# Patient Record
Sex: FEMALE
[De-identification: names, ages, dates, MRNs, and addresses within clinical notes are randomized per-mention and may not be internally consistent; named-entity substitution may affect disease eponyms.]

---

## 2020-12-10 PROBLEM — Z00.00 ENCOUNTER FOR PREVENTIVE HEALTH EXAMINATION: Status: ACTIVE | Noted: 2020-12-10

## 2020-12-11 ENCOUNTER — APPOINTMENT (OUTPATIENT)
Dept: OBGYN | Facility: CLINIC | Age: 50
End: 2020-12-11
Payer: COMMERCIAL

## 2020-12-11 ENCOUNTER — ASOB RESULT (OUTPATIENT)
Age: 50
End: 2020-12-11

## 2020-12-11 VITALS
SYSTOLIC BLOOD PRESSURE: 110 MMHG | HEIGHT: 60 IN | WEIGHT: 100 LBS | BODY MASS INDEX: 19.63 KG/M2 | DIASTOLIC BLOOD PRESSURE: 70 MMHG

## 2020-12-11 PROCEDURE — 76830 TRANSVAGINAL US NON-OB: CPT

## 2020-12-11 PROCEDURE — 99203 OFFICE O/P NEW LOW 30 MIN: CPT | Mod: 25

## 2020-12-11 PROCEDURE — 81003 URINALYSIS AUTO W/O SCOPE: CPT | Mod: QW

## 2020-12-11 PROCEDURE — 99072 ADDL SUPL MATRL&STAF TM PHE: CPT

## 2020-12-11 NOTE — PHYSICAL EXAM
[Appropriately responsive] : appropriately responsive [Alert] : alert [No Acute Distress] : no acute distress [Oriented x3] : oriented x3 [Labia Majora] : normal [Labia Minora] : normal [No Bleeding] : There was no active vaginal bleeding [Normal] : normal [Uterine Adnexae] : normal

## 2020-12-20 LAB
BILIRUB UR QL STRIP: NORMAL
GLUCOSE UR-MCNC: NORMAL
HCG UR QL: 0.2 EU/DL
HGB UR QL STRIP.AUTO: ABNORMAL
KETONES UR-MCNC: NORMAL
LEUKOCYTE ESTERASE UR QL STRIP: NORMAL
NITRITE UR QL STRIP: NORMAL
PH UR STRIP: 5
PROT UR STRIP-MCNC: NORMAL
SP GR UR STRIP: 1.01

## 2020-12-20 NOTE — END OF VISIT
[FreeTextEntry3] : I, Juliana Lugo, solely acted as scribe for Dr. Teodoro Gunter on 12/11/2020.\par All medical entries made by the Scribe were at my, Dr. Gunter's direction and personally dictated by me on 12/11/2020. I have reviewed the chart and agree that the record accurately reflects my personal performance of the history, physical exam, assessment and plan. I have also personally directed, reviewed, and agreed with the chart.

## 2020-12-20 NOTE — HISTORY OF PRESENT ILLNESS
[N] : Patient is not sexually active [Y] : Positive pregnancy history [Staining] : staining [Hot Flashes] : hot flashes [Yes] : Patient has concerns regarding sex [TextBox_4] : Consultation for menopausal  spotting [Mammogramdate] : 11/2020 [TextBox_19] : nati normal as per pt  [BreastSonogramDate] : 11/2020 [PapSmeardate] : 6/2020 [TextBox_31] : Dr Miller [ColonoscopyDate] : never had [PGHxTotal] : 2 [[facility]] : 2 [TextBox_29] : n/a [TextBox_36] : pt reports previous vaginal burning used monostat, reports painful intercourse vaginal dryness [TextBox_9] : 12 [TextBox_28] : spotting [TextBox_34] : reports dense breast [TextBox_18] : a [FreeTextEntry1] : painful, dryness

## 2020-12-20 NOTE — DISCUSSION/SUMMARY
[FreeTextEntry1] : Patient is anxious and nervous about her complaints of postmenopausal bleeding. We talked at length about the etiology of abnormal bleeding including the hormonal, structural and malignant causes. We reviewed today's pelvic sonogram findings which were normal. We discussed further and complete evaluation, namely office hysteroscopy with endometrial biopsy under sedation. The risks and benefits of the procedure was discussed in detail. Patient expressed understanding. Will send request to coordinator to schedule procedure. All of her concerns were addressed, questions answered and reassurance was given. \par \par During this visit 20 minutes were spent face-to-face with greater than 50% of the time dedicated to counseling.

## 2020-12-21 ENCOUNTER — NON-APPOINTMENT (OUTPATIENT)
Age: 50
End: 2020-12-21

## 2020-12-22 ENCOUNTER — NON-APPOINTMENT (OUTPATIENT)
Age: 50
End: 2020-12-22

## 2020-12-27 ENCOUNTER — NON-APPOINTMENT (OUTPATIENT)
Age: 50
End: 2020-12-27

## 2021-01-05 ENCOUNTER — APPOINTMENT (OUTPATIENT)
Dept: OBGYN | Facility: CLINIC | Age: 51
End: 2021-01-05
Payer: COMMERCIAL

## 2021-01-05 DIAGNOSIS — N95.0 POSTMENOPAUSAL BLEEDING: ICD-10-CM

## 2021-01-05 PROCEDURE — 99072 ADDL SUPL MATRL&STAF TM PHE: CPT

## 2021-01-05 PROCEDURE — 99213 OFFICE O/P EST LOW 20 MIN: CPT | Mod: 25

## 2021-01-05 PROCEDURE — 58100 BIOPSY OF UTERUS LINING: CPT

## 2021-01-05 NOTE — PHYSICAL EXAM
[Appropriately responsive] : appropriately responsive [Alert] : alert [No Acute Distress] : no acute distress [Oriented x3] : oriented x3 [Labia Majora] : normal [Labia Minora] : normal [Normal] : normal [No Bleeding] : There was no active vaginal bleeding [Cervical Stenosis] : cervical stenosis

## 2021-01-06 ENCOUNTER — TRANSCRIPTION ENCOUNTER (OUTPATIENT)
Age: 51
End: 2021-01-06

## 2021-01-09 ENCOUNTER — TRANSCRIPTION ENCOUNTER (OUTPATIENT)
Age: 51
End: 2021-01-09

## 2021-01-11 ENCOUNTER — TRANSCRIPTION ENCOUNTER (OUTPATIENT)
Age: 51
End: 2021-01-11

## 2021-01-11 LAB — CORE LAB BIOPSY: NORMAL

## 2021-01-12 ENCOUNTER — APPOINTMENT (OUTPATIENT)
Dept: DISASTER EMERGENCY | Facility: CLINIC | Age: 51
End: 2021-01-12

## 2021-01-13 ENCOUNTER — TRANSCRIPTION ENCOUNTER (OUTPATIENT)
Age: 51
End: 2021-01-13

## 2021-01-13 NOTE — END OF VISIT
[FreeTextEntry3] : I, Remberto Kearney, acted solely as a scribe for Dr. Gunter on this date 01/05/2021.\par All medical record entries made by the Scribe were at my, Dr. Gunter's direction and personally dictated by me on  01/05/2021. I have reviewed the chart and agree that the record accurately reflects my personal performance of the history, physical exam, assessment and plan. I have also personally directed, reviewed, and agreed with the chart.\par

## 2021-01-13 NOTE — PLAN
[FreeTextEntry1] : We discussed the results of pelvic sonogram from 12/11/20 which demonstrated heterogenous myometrium endo thickness measuring 4 mm with scant cavity fluid, normal appearing RT ovary and clear LT ovary cyst 7 mm. Differential diagnosis of postmenopausal bleeding was discussed. Etiologies include normal endometrium vs polypoid endometrium vs hyperplasia vs malignancy. Management options reviewed with patient offered office hysteroscopy vs same procedure under sedation. She states that she has a leaky heart valve and feels anxious about having the procedure under anesthesia. She prefers to have EMB in the office. We discussed the findings on pelvic exam: Cervical stenosis.I told her we can try to proceed with EMB by using lidocaine to numb the cervix. She was agreeable. Informed consent was obtained. EMB was performed without any issues. Post procedure expectations were given. Patient was encouraged to register for the portal, if not already registered. The use of the portal for non- emergent communication was discussed- patient is aware to avoid use for emergent issues. She will message me on the portal for a discussion of the results.\par \par During this visit 30 minutes were spent face-to-face with greater than 50% of the time dedicated to counseling.\par \par \par

## 2021-01-13 NOTE — HISTORY OF PRESENT ILLNESS
[Patient reported PAP Smear was normal] : Patient reported PAP Smear was normal [Y] : Positive pregnancy history [Menarche Age: ____] : age at menarche was [unfilled] [FreeTextEntry1] : Beatriz is here for follow up visit. She is doing well. She feels very anxious about this whole situation of postmenopausal spotting. She reports having vaginal irritation and associates it with having possible yeast infection.\par \par \par  [PapSmeardate] : 06/03/20 [PGHxTotal] : 2 [facility]

## 2021-01-15 ENCOUNTER — APPOINTMENT (OUTPATIENT)
Dept: OBGYN | Facility: HOSPITAL | Age: 51
End: 2021-01-15

## 2021-01-19 ENCOUNTER — APPOINTMENT (OUTPATIENT)
Dept: OBGYN | Facility: CLINIC | Age: 51
End: 2021-01-19
Payer: COMMERCIAL

## 2021-01-19 VITALS
HEIGHT: 60 IN | DIASTOLIC BLOOD PRESSURE: 68 MMHG | TEMPERATURE: 96.8 F | BODY MASS INDEX: 19.63 KG/M2 | WEIGHT: 100 LBS | SYSTOLIC BLOOD PRESSURE: 94 MMHG

## 2021-01-19 PROCEDURE — 99212 OFFICE O/P EST SF 10 MIN: CPT

## 2021-01-19 PROCEDURE — 99072 ADDL SUPL MATRL&STAF TM PHE: CPT

## 2021-01-22 NOTE — DISCUSSION/SUMMARY
[FreeTextEntry1] : We discussed the nature of cervical stenosis as related to menopause and the was given a complete discussion on the pathology report which was negative. She will RTO on her regular schedule. All questions were answered.

## 2021-01-22 NOTE — PHYSICAL EXAM
[Appropriately responsive] : appropriately responsive [Alert] : alert [No Acute Distress] : no acute distress [Soft] : soft

## 2021-01-22 NOTE — HISTORY OF PRESENT ILLNESS
[Patient reported mammogram was normal] : Patient reported mammogram was normal [Patient reported breast sonogram was normal] : Patient reported breast sonogram was normal [Patient reported PAP Smear was normal] : Patient reported PAP Smear was normal [LMP unknown] : LMP unknown [N] : Patient is not sexually active [Y] : Positive pregnancy history [Night Sweats] : night sweats [unknown] : Patient is unsure of the date of her LMP [Menarche Age: ____] : age at menarche was [unfilled] [Previously active] : previously active [Men] : men [Vaginal] : vaginal [No] : No [FreeTextEntry1] : Beatriz is here for follow up after her in-office treatment for cervical stenosis and an endometrial biopsy. She is relieved and happy that it was able to be accomplished.  [Mammogramdate] : 11/01/2020 [TextBox_19] : AS PER PATIENT [BreastSonogramDate] : 11/01/2020 [TextBox_25] : AS PER PATIENT [TextBox_31] : AS PER PATIENT  [PapSmeardate] : 06/01/2020 [PGHxTotal] : 2 [facility] [[facility] : 2

## 2021-01-29 ENCOUNTER — APPOINTMENT (OUTPATIENT)
Dept: OBGYN | Facility: CLINIC | Age: 51
End: 2021-01-29

## 2021-06-23 ENCOUNTER — APPOINTMENT (OUTPATIENT)
Dept: OBGYN | Facility: CLINIC | Age: 51
End: 2021-06-23
Payer: COMMERCIAL

## 2021-06-23 VITALS
WEIGHT: 100.13 LBS | TEMPERATURE: 97.8 F | SYSTOLIC BLOOD PRESSURE: 110 MMHG | DIASTOLIC BLOOD PRESSURE: 70 MMHG | HEIGHT: 60 IN | BODY MASS INDEX: 19.66 KG/M2

## 2021-06-23 DIAGNOSIS — Z78.9 OTHER SPECIFIED HEALTH STATUS: ICD-10-CM

## 2021-06-23 DIAGNOSIS — Z82.49 FAMILY HISTORY OF ISCHEMIC HEART DISEASE AND OTHER DISEASES OF THE CIRCULATORY SYSTEM: ICD-10-CM

## 2021-06-23 DIAGNOSIS — Z80.3 FAMILY HISTORY OF MALIGNANT NEOPLASM OF BREAST: ICD-10-CM

## 2021-06-23 DIAGNOSIS — Z01.419 ENCOUNTER FOR GYNECOLOGICAL EXAMINATION (GENERAL) (ROUTINE) W/OUT ABNORMAL FINDINGS: ICD-10-CM

## 2021-06-23 DIAGNOSIS — Z83.3 FAMILY HISTORY OF DIABETES MELLITUS: ICD-10-CM

## 2021-06-23 PROCEDURE — 99396 PREV VISIT EST AGE 40-64: CPT

## 2021-06-23 PROCEDURE — 99072 ADDL SUPL MATRL&STAF TM PHE: CPT

## 2021-06-23 NOTE — PHYSICAL EXAM
[Appropriately responsive] : appropriately responsive [Alert] : alert [No Acute Distress] : no acute distress [Soft] : soft [Non-tender] : non-tender [Non-distended] : non-distended [Oriented x3] : oriented x3 [Examination Of The Breasts] : a normal appearance [No Discharge] : no discharge [No Masses] : no breast masses were palpable [Labia Majora] : normal [Labia Minora] : normal [Atrophy] : atrophy [No Bleeding] : There was no active vaginal bleeding [Normal] : normal

## 2021-06-23 NOTE — HISTORY OF PRESENT ILLNESS
[LMP unknown] : LMP unknown [N] : Patient does not use contraception [Y] : Positive pregnancy history [unknown] : Patient is unsure of the date of her LMP [Menarche Age: ____] : age at menarche was [unfilled] [Currently Active] : currently active [Men] : men [Vaginal] : vaginal [No] : No [Patient refuses STI testing] : Patient refuses STI testing [FreeTextEntry1] : \par \par Beatriz is 50 y/o presents for an annual well woman gyn visit.\par \par Reports she is doing well. Her last mammo and BUS were on 11/09/2020 [facility].\par \par LMP is unknown.\par \par Denies GYN complaints.\par  [Mammogramdate] : 01/19/2021 [TextBox_19] : BR1 [BreastSonogramDate] : 01/19/2021 [TextBox_25] : BR1 [PapSmeardate] : 06/03/2020 [TextBox_31] : AS PER PT  [PGHxTotal] : 2 [[facility] [[facility]] : 2

## 2021-06-23 NOTE — DISCUSSION/SUMMARY
[FreeTextEntry1] : \par 50 y/o\par gyn annual\par -pap w/ HPV\par -Mammo and breast US script\par \par RTO in one year for gyn annual and prn.\par

## 2021-07-06 LAB
CYTOLOGY CVX/VAG DOC THIN PREP: NORMAL
HPV HIGH+LOW RISK DNA PNL CVX: NOT DETECTED

## 2022-06-27 ENCOUNTER — APPOINTMENT (OUTPATIENT)
Dept: OBGYN | Facility: CLINIC | Age: 52
End: 2022-06-27
Payer: COMMERCIAL

## 2022-06-27 ENCOUNTER — APPOINTMENT (OUTPATIENT)
Dept: OBGYN | Facility: CLINIC | Age: 52
End: 2022-06-27

## 2022-06-27 VITALS
SYSTOLIC BLOOD PRESSURE: 112 MMHG | HEIGHT: 60 IN | DIASTOLIC BLOOD PRESSURE: 66 MMHG | BODY MASS INDEX: 19.63 KG/M2 | WEIGHT: 100 LBS

## 2022-06-27 PROCEDURE — 99396 PREV VISIT EST AGE 40-64: CPT

## 2022-06-27 NOTE — HISTORY OF PRESENT ILLNESS
[LMP unknown] : LMP unknown [N] : Patient does not use contraception [Y] : Positive pregnancy history [unknown] : Patient is unsure of the date of her LMP [Menarche Age: ____] : age at menarche was [unfilled] [Currently Active] : currently active [Men] : men [Vaginal] : vaginal [No] : No [Patient refuses STI testing] : Patient refuses STI testing [FreeTextEntry1] : Beatriz is a  LMP 3 years ago postmenopausal several years who presents for annual exam. She is without complaints. Denies pelvic pain, abnormal bleeding, or vaginal discharge. Denies issues with bowel or bladder function. \par She is sexually active with 1 male partner (s). She has dryness during intercourse.  \par  Feels safe at home. Denies depression/abuse. \par \par  [Mammogramdate] : 11/12/2021 [TextBox_19] : BR2 [BreastSonogramDate] : 11/12/2021 [TextBox_25] : BR1 [PapSmeardate] : 06/12/2021 [TextBox_31] : NORMAL [HPVDate] : 06/23/2021 [TextBox_78] : NEG [PGHxTotal] : 2 [[facility]] : 2 [[facility]] : 2

## 2022-06-27 NOTE — PHYSICAL EXAM
[Chaperone Present] : A chaperone was present in the examining room during all aspects of the physical examination [Appropriately responsive] : appropriately responsive [Alert] : alert [No Acute Distress] : no acute distress [No Lymphadenopathy] : no lymphadenopathy [Soft] : soft [Non-tender] : non-tender [Non-distended] : non-distended [Oriented x3] : oriented x3 [Examination Of The Breasts] : a normal appearance [No Discharge] : no discharge [No Masses] : no breast masses were palpable [Labia Majora] : normal [Labia Minora] : normal [No Bleeding] : There was no active vaginal bleeding [Normal] : normal [Uterine Adnexae] : non-palpable [FreeTextEntry1] : gagan

## 2022-06-27 NOTE — DISCUSSION/SUMMARY
[FreeTextEntry1] :   The benefits of adequate calcium intake and a daily multivitamin along with routine daily cardiovascular exercise were reviewed with the patient.\par   The patient was informed regarding the benefits of a screening colonoscopy.\par   The importance of safer-sex was discussed with the patient.\par We reviewed ASCCP/ACOG guidelines for pap smears. \par Use of vaginal lubricators was reviewed.

## 2022-07-01 LAB — HPV HIGH+LOW RISK DNA PNL CVX: NOT DETECTED

## 2022-07-02 LAB — CYTOLOGY CVX/VAG DOC THIN PREP: ABNORMAL

## 2022-08-05 ENCOUNTER — TRANSCRIPTION ENCOUNTER (OUTPATIENT)
Age: 52
End: 2022-08-05

## 2022-08-05 ENCOUNTER — NON-APPOINTMENT (OUTPATIENT)
Age: 52
End: 2022-08-05

## 2022-08-08 ENCOUNTER — TRANSCRIPTION ENCOUNTER (OUTPATIENT)
Age: 52
End: 2022-08-08

## 2022-08-15 ENCOUNTER — NON-APPOINTMENT (OUTPATIENT)
Age: 52
End: 2022-08-15

## 2022-09-15 ENCOUNTER — APPOINTMENT (OUTPATIENT)
Dept: UROLOGY | Facility: CLINIC | Age: 52
End: 2022-09-15

## 2022-09-15 VITALS
WEIGHT: 100 LBS | BODY MASS INDEX: 19.63 KG/M2 | HEART RATE: 68 BPM | OXYGEN SATURATION: 97 % | HEIGHT: 60 IN | DIASTOLIC BLOOD PRESSURE: 73 MMHG | SYSTOLIC BLOOD PRESSURE: 118 MMHG

## 2022-09-15 PROCEDURE — 99203 OFFICE O/P NEW LOW 30 MIN: CPT

## 2022-09-15 NOTE — ASSESSMENT
[FreeTextEntry1] : 52 year old woman with BLOOD on UA, but not clinically significant number of RBCs on microscopy. We discussed that microscopic hematuria work up is recommended for >3 RBCs/hpf (>5 RBCs for [facility] lab). We discussed that for microscopic hematuria, the differential diagnosis includes both benign and malignant conditions including renal stones, BPH, urinary tract infections, and cancer of the bladder or ureter or kidney. Cystoscopy would be recommended to rule out pathology in the bladder. CT Urogram would be recommended to evaluate for presence of nephroureteral stones or malignancies. Urinalysis and urine culture were recommended to recheck for urinary RBCS and urinary tract infection. Pt agrees and understands.\par \par Abnormal Urinalysis\par - UA, UCx\par - AMH work up if UA reveals significant RBCs\par - If not, RTO in 3 months for repeat urine studies

## 2022-09-15 NOTE — HISTORY OF PRESENT ILLNESS
[FreeTextEntry1] : 52 year old woman seen 09/15/2022 with complaint of microscopic hematuria. She reports he had gross hematuria in setting of UTI, but it resolved with treatment of UTI. She had UA wihtout micro that showed blood, no microscopy done. CTU was negative.

## 2022-09-16 LAB
APPEARANCE: CLEAR
BACTERIA: NEGATIVE
BILIRUBIN URINE: NEGATIVE
BLOOD URINE: NEGATIVE
COLOR: YELLOW
GLUCOSE QUALITATIVE U: NEGATIVE
HYALINE CASTS: 1 /LPF
KETONES URINE: NEGATIVE
LEUKOCYTE ESTERASE URINE: NEGATIVE
MICROSCOPIC-UA: NORMAL
NITRITE URINE: NEGATIVE
PH URINE: 6
PROTEIN URINE: NEGATIVE
RED BLOOD CELLS URINE: 3 /HPF
SPECIFIC GRAVITY URINE: >=1.03
SQUAMOUS EPITHELIAL CELLS: 1 /HPF
UROBILINOGEN URINE: NORMAL
WHITE BLOOD CELLS URINE: 2 /HPF

## 2022-09-19 ENCOUNTER — APPOINTMENT (OUTPATIENT)
Dept: GASTROENTEROLOGY | Facility: CLINIC | Age: 52
End: 2022-09-19

## 2022-09-19 VITALS
BODY MASS INDEX: 19.63 KG/M2 | DIASTOLIC BLOOD PRESSURE: 81 MMHG | WEIGHT: 100 LBS | SYSTOLIC BLOOD PRESSURE: 114 MMHG | HEIGHT: 60 IN | HEART RATE: 66 BPM

## 2022-09-19 DIAGNOSIS — K42.9 UMBILICAL HERNIA W/OUT OBSTRUCTION OR GANGRENE: ICD-10-CM

## 2022-09-19 DIAGNOSIS — K76.89 OTHER SPECIFIED DISEASES OF LIVER: ICD-10-CM

## 2022-09-19 LAB — BACTERIA UR CULT: NORMAL

## 2022-09-19 PROCEDURE — 99202 OFFICE O/P NEW SF 15 MIN: CPT

## 2022-09-20 ENCOUNTER — TRANSCRIPTION ENCOUNTER (OUTPATIENT)
Age: 52
End: 2022-09-20

## 2022-09-27 PROBLEM — K76.89 HEPATIC CYST: Status: ACTIVE | Noted: 2022-09-27

## 2022-09-27 PROBLEM — K42.9 UMBILICAL HERNIA WITHOUT OBSTRUCTION AND WITHOUT GANGRENE: Status: ACTIVE | Noted: 2022-09-27

## 2022-09-27 NOTE — HISTORY OF PRESENT ILLNESS
[FreeTextEntry1] : 51yo female with liver lesion on ct scan\par \par Pt referred for evaluation of liver lesion\par \par I reviewed imaging and shows small liver cyst\par \par Also with small umbilical hernia\par She up to date on colon cancer screening

## 2022-09-27 NOTE — PHYSICAL EXAM
[Alert] : alert [Normal Voice/Communication] : normal voice/communication [Healthy Appearing] : healthy appearing [No Acute Distress] : no acute distress [Sclera] : the sclera and conjunctiva were normal [Hearing Threshold Finger Rub Not [place]] : hearing was normal [Normal Lips/Gums] : the lips and gums were normal [Oropharynx] : the oropharynx was normal [Normal Appearance] : the appearance of the neck was normal [No Neck Mass] : no neck mass was observed [No Respiratory Distress] : no respiratory distress [No Acc Muscle Use] : no accessory muscle use [Respiration, Rhythm And Depth] : normal respiratory rhythm and effort [Auscultation Breath Sounds / Voice Sounds] : lungs were clear to auscultation bilaterally [Heart Rate And Rhythm] : heart rate was normal and rhythm regular [Normal S1, S2] : normal S1 and S2 [Murmurs] : no murmurs [Bowel Sounds] : normal bowel sounds [Abdomen Tenderness] : non-tender [No Masses] : no abdominal mass palpated [Abdomen Soft] : soft [] : no hepatosplenomegaly [Umbilical] : umbilical [Oriented To Time, Place, And Person] : oriented to person, place, and time

## 2022-12-15 ENCOUNTER — APPOINTMENT (OUTPATIENT)
Dept: UROLOGY | Facility: CLINIC | Age: 52
End: 2022-12-15

## 2022-12-15 VITALS
SYSTOLIC BLOOD PRESSURE: 131 MMHG | WEIGHT: 100 LBS | BODY MASS INDEX: 19.63 KG/M2 | OXYGEN SATURATION: 97 % | HEIGHT: 60 IN | DIASTOLIC BLOOD PRESSURE: 81 MMHG | HEART RATE: 61 BPM

## 2022-12-15 DIAGNOSIS — R31.29 OTHER MICROSCOPIC HEMATURIA: ICD-10-CM

## 2022-12-15 PROCEDURE — 99213 OFFICE O/P EST LOW 20 MIN: CPT

## 2022-12-15 NOTE — ASSESSMENT
[FreeTextEntry1] : 52 year old woman with BLOOD on UA, but not clinically significant number of RBCs on microscopy. We discussed that microscopic hematuria work up is recommended for >3 RBCs/hpf (>5 RBCs for [facility] lab). We discussed that for microscopic hematuria, the differential diagnosis includes both benign and malignant conditions including renal stones, BPH, urinary tract infections, and cancer of the bladder or ureter or kidney. Cystoscopy would be recommended to rule out pathology in the bladder. CT Urogram would be recommended to evaluate for presence of nephroureteral stones or malignancies. Urinalysis and urine culture were recommended to recheck for urinary RBCS and urinary tract infection. Pt agrees and understands.\par \par Abnormal Urinalysis\par - UA, UCx\par - AMH work up if UA reveals significant RBCs\par - If not, RTO PRN

## 2022-12-15 NOTE — HISTORY OF PRESENT ILLNESS
[FreeTextEntry1] : 52 year old woman seen 09/15/2022 with complaint of microscopic hematuria. She reports he had gross hematuria in setting of UTI, but it resolved with treatment of UTI. She had UA wihtout micro that showed blood, no microscopy done. CT renal stone protocol was negative. \par \par 12/15/2022: Patient presents for follow up. Urine studies negative at last visit. She reports  symptoms and other medical  issues remain unchanged from above. No hematuria, no dysuria, no frequency, no urgency, no hesitancy, no straining. No incontinence. No fevers, no chills, no nausea, no vomiting, no flank pain.

## 2022-12-16 ENCOUNTER — TRANSCRIPTION ENCOUNTER (OUTPATIENT)
Age: 52
End: 2022-12-16

## 2022-12-16 LAB
APPEARANCE: CLEAR
BACTERIA: NEGATIVE
BILIRUBIN URINE: NEGATIVE
BLOOD URINE: NEGATIVE
COLOR: NORMAL
GLUCOSE QUALITATIVE U: NEGATIVE
HYALINE CASTS: 0 /LPF
KETONES URINE: NEGATIVE
LEUKOCYTE ESTERASE URINE: NEGATIVE
MICROSCOPIC-UA: NORMAL
NITRITE URINE: NEGATIVE
PH URINE: 6
PROTEIN URINE: NEGATIVE
RED BLOOD CELLS URINE: 0 /HPF
SPECIFIC GRAVITY URINE: 1.01
SQUAMOUS EPITHELIAL CELLS: 0 /HPF
UROBILINOGEN URINE: NORMAL
WHITE BLOOD CELLS URINE: 0 /HPF

## 2022-12-19 ENCOUNTER — TRANSCRIPTION ENCOUNTER (OUTPATIENT)
Age: 52
End: 2022-12-19

## 2022-12-19 LAB — BACTERIA UR CULT: NORMAL

## 2023-03-22 ENCOUNTER — TRANSCRIPTION ENCOUNTER (OUTPATIENT)
Age: 53
End: 2023-03-22

## 2023-04-06 ENCOUNTER — TRANSCRIPTION ENCOUNTER (OUTPATIENT)
Age: 53
End: 2023-04-06

## 2023-06-01 ENCOUNTER — APPOINTMENT (OUTPATIENT)
Dept: GASTROENTEROLOGY | Facility: CLINIC | Age: 53
End: 2023-06-01
Payer: COMMERCIAL

## 2023-06-01 VITALS
SYSTOLIC BLOOD PRESSURE: 117 MMHG | HEART RATE: 61 BPM | DIASTOLIC BLOOD PRESSURE: 71 MMHG | WEIGHT: 100 LBS | HEIGHT: 60 IN | BODY MASS INDEX: 19.63 KG/M2

## 2023-06-01 DIAGNOSIS — R19.4 CHANGE IN BOWEL HABIT: ICD-10-CM

## 2023-06-01 DIAGNOSIS — Z12.11 ENCOUNTER FOR SCREENING FOR MALIGNANT NEOPLASM OF COLON: ICD-10-CM

## 2023-06-01 PROCEDURE — 99213 OFFICE O/P EST LOW 20 MIN: CPT

## 2023-06-01 NOTE — ASSESSMENT
[FreeTextEntry1] : 54yo female for screening colonoscopy\par \par will use 2 day miralax gatorade prep given relative constipation\par Risks and benefits of procedure(s) discussed with patient in detail, including but not limited to, perforation, bleeding, reaction to anesthesia, missed lesions.\par

## 2023-06-01 NOTE — PHYSICAL EXAM

## 2023-06-05 ENCOUNTER — TRANSCRIPTION ENCOUNTER (OUTPATIENT)
Age: 53
End: 2023-06-05

## 2023-06-12 ENCOUNTER — TRANSCRIPTION ENCOUNTER (OUTPATIENT)
Age: 53
End: 2023-06-12

## 2023-06-16 ENCOUNTER — RESULT REVIEW (OUTPATIENT)
Age: 53
End: 2023-06-16

## 2023-06-16 ENCOUNTER — APPOINTMENT (OUTPATIENT)
Dept: GASTROENTEROLOGY | Facility: AMBULATORY MEDICAL SERVICES | Age: 53
End: 2023-06-16
Payer: COMMERCIAL

## 2023-06-16 PROCEDURE — 45385 COLONOSCOPY W/LESION REMOVAL: CPT | Mod: 33

## 2023-06-16 PROCEDURE — 45380 COLONOSCOPY AND BIOPSY: CPT | Mod: 33,59

## 2023-06-26 ENCOUNTER — TRANSCRIPTION ENCOUNTER (OUTPATIENT)
Age: 53
End: 2023-06-26

## 2023-06-30 ENCOUNTER — APPOINTMENT (OUTPATIENT)
Dept: COLORECTAL SURGERY | Facility: CLINIC | Age: 53
End: 2023-06-30
Payer: COMMERCIAL

## 2023-06-30 DIAGNOSIS — R89.8 OTHER ABNORMAL FINDINGS IN SPECIMENS FROM OTHER ORGANS, SYSTEMS AND TISSUES: ICD-10-CM

## 2023-06-30 PROCEDURE — 99443: CPT

## 2023-07-05 ENCOUNTER — TRANSCRIPTION ENCOUNTER (OUTPATIENT)
Age: 53
End: 2023-07-05

## 2023-07-18 ENCOUNTER — APPOINTMENT (OUTPATIENT)
Dept: OBGYN | Facility: CLINIC | Age: 53
End: 2023-07-18
Payer: COMMERCIAL

## 2023-07-18 VITALS
HEIGHT: 60 IN | WEIGHT: 100 LBS | BODY MASS INDEX: 19.63 KG/M2 | SYSTOLIC BLOOD PRESSURE: 126 MMHG | DIASTOLIC BLOOD PRESSURE: 86 MMHG

## 2023-07-18 DIAGNOSIS — Z12.31 ENCOUNTER FOR SCREENING MAMMOGRAM FOR MALIGNANT NEOPLASM OF BREAST: ICD-10-CM

## 2023-07-18 DIAGNOSIS — N95.2 POSTMENOPAUSAL ATROPHIC VAGINITIS: ICD-10-CM

## 2023-07-18 DIAGNOSIS — R92.2 INCONCLUSIVE MAMMOGRAM: ICD-10-CM

## 2023-07-18 DIAGNOSIS — Z01.419 ENCOUNTER FOR GYNECOLOGICAL EXAMINATION (GENERAL) (ROUTINE) W/OUT ABNORMAL FINDINGS: ICD-10-CM

## 2023-07-18 DIAGNOSIS — Z12.4 ENCOUNTER FOR SCREENING FOR MALIGNANT NEOPLASM OF CERVIX: ICD-10-CM

## 2023-07-18 PROCEDURE — 99396 PREV VISIT EST AGE 40-64: CPT

## 2023-07-18 NOTE — HISTORY OF PRESENT ILLNESS
[No] : Patient does not have concerns regarding sex [Previously active] : previously active [Patient refuses STI testing] : Patient refuses STI testing [FreeTextEntry1] : Beatriz is a  LMP  who presents for annual exam. She is without complaints. Denies pelvic pain, abnormal bleeding, or vaginal discharge. Denies issues with bowel or bladder function. \par 2 c-sections\par She is sexually active with male partner (s). \par Feels safe at home. Denies depression/abuse. \par \par Recent colonoscopy showing multiple polyps, 2 with high grade dysplasia. She is scheduled for another colonoscopy in July due to multiple polyps, they were not able to get everything on the first try. \par Her father recently passed away from throat cancer. Aunt with ovarian cancer. Cousin with breast cancer.

## 2023-07-18 NOTE — PHYSICAL EXAM
[Chaperone Present] : A chaperone was present in the examining room during all aspects of the physical examination [Appropriately responsive] : appropriately responsive [Alert] : alert [No Acute Distress] : no acute distress [No Lymphadenopathy] : no lymphadenopathy [Soft] : soft [Non-tender] : non-tender [Non-distended] : non-distended [Oriented x3] : oriented x3 [Examination Of The Breasts] : a normal appearance [No Discharge] : no discharge [No Masses] : no breast masses were palpable [Labia Majora] : normal [Labia Minora] : normal [Atrophy] : atrophy [No Bleeding] : There was no active vaginal bleeding [Normal] : normal [Uterine Adnexae] : non-palpable [FreeTextEntry1] : gagan

## 2023-07-20 ENCOUNTER — TRANSCRIPTION ENCOUNTER (OUTPATIENT)
Age: 53
End: 2023-07-20

## 2023-07-20 LAB — HPV HIGH+LOW RISK DNA PNL CVX: NOT DETECTED

## 2023-07-21 LAB — CYTOLOGY CVX/VAG DOC THIN PREP: ABNORMAL

## 2023-07-24 ENCOUNTER — TRANSCRIPTION ENCOUNTER (OUTPATIENT)
Age: 53
End: 2023-07-24

## 2023-07-25 ENCOUNTER — NON-APPOINTMENT (OUTPATIENT)
Age: 53
End: 2023-07-25

## 2023-07-25 PROBLEM — R89.8 ABNORMAL GENETIC TEST: Status: ACTIVE | Noted: 2023-07-25

## 2023-07-28 ENCOUNTER — APPOINTMENT (OUTPATIENT)
Dept: GASTROENTEROLOGY | Facility: AMBULATORY MEDICAL SERVICES | Age: 53
End: 2023-07-28
Payer: COMMERCIAL

## 2023-07-28 ENCOUNTER — RESULT REVIEW (OUTPATIENT)
Age: 53
End: 2023-07-28

## 2023-07-28 PROCEDURE — 45385 COLONOSCOPY W/LESION REMOVAL: CPT | Mod: GC

## 2023-07-28 PROCEDURE — 45380 COLONOSCOPY AND BIOPSY: CPT | Mod: GC,59

## 2023-08-03 ENCOUNTER — TRANSCRIPTION ENCOUNTER (OUTPATIENT)
Age: 53
End: 2023-08-03

## 2023-08-15 ENCOUNTER — TRANSCRIPTION ENCOUNTER (OUTPATIENT)
Age: 53
End: 2023-08-15

## 2023-08-16 ENCOUNTER — TRANSCRIPTION ENCOUNTER (OUTPATIENT)
Age: 53
End: 2023-08-16

## 2023-08-29 ENCOUNTER — TRANSCRIPTION ENCOUNTER (OUTPATIENT)
Age: 53
End: 2023-08-29

## 2023-09-01 ENCOUNTER — TRANSCRIPTION ENCOUNTER (OUTPATIENT)
Age: 53
End: 2023-09-01

## 2023-09-05 ENCOUNTER — TRANSCRIPTION ENCOUNTER (OUTPATIENT)
Age: 53
End: 2023-09-05

## 2023-09-15 ENCOUNTER — TRANSCRIPTION ENCOUNTER (OUTPATIENT)
Age: 53
End: 2023-09-15

## 2023-10-20 ENCOUNTER — NON-APPOINTMENT (OUTPATIENT)
Age: 53
End: 2023-10-20

## 2024-01-11 NOTE — ASSESSMENT
[FreeTextEntry1] : 53yo female with benign hepatic cyst, small umbilical hernia\par \par small hepatic cyst\par no further imaging needed\par \par asymptomatic umbilical hernia - will observe\par pt to call if develops discomfort
Provider pre-printed instructions given

## 2024-02-01 ENCOUNTER — APPOINTMENT (OUTPATIENT)
Dept: GASTROENTEROLOGY | Facility: AMBULATORY MEDICAL SERVICES | Age: 54
End: 2024-02-01
Payer: COMMERCIAL

## 2024-02-01 ENCOUNTER — RESULT REVIEW (OUTPATIENT)
Age: 54
End: 2024-02-01

## 2024-02-01 PROCEDURE — 45380 COLONOSCOPY AND BIOPSY: CPT | Mod: 33,59

## 2024-02-01 PROCEDURE — 45385 COLONOSCOPY W/LESION REMOVAL: CPT | Mod: 33

## 2024-02-02 ENCOUNTER — TRANSCRIPTION ENCOUNTER (OUTPATIENT)
Age: 54
End: 2024-02-02

## 2024-02-05 ENCOUNTER — TRANSCRIPTION ENCOUNTER (OUTPATIENT)
Age: 54
End: 2024-02-05

## 2024-02-06 ENCOUNTER — TRANSCRIPTION ENCOUNTER (OUTPATIENT)
Age: 54
End: 2024-02-06

## 2024-02-07 ENCOUNTER — TRANSCRIPTION ENCOUNTER (OUTPATIENT)
Age: 54
End: 2024-02-07

## 2024-02-27 ENCOUNTER — APPOINTMENT (OUTPATIENT)
Dept: COLORECTAL SURGERY | Facility: CLINIC | Age: 54
End: 2024-02-27
Payer: COMMERCIAL

## 2024-02-27 DIAGNOSIS — K63.5 POLYP OF COLON: ICD-10-CM

## 2024-02-27 PROCEDURE — 99205 OFFICE O/P NEW HI 60 MIN: CPT

## 2024-02-28 ENCOUNTER — TRANSCRIPTION ENCOUNTER (OUTPATIENT)
Age: 54
End: 2024-02-28

## 2024-02-29 PROBLEM — K63.5 COLON POLYPS: Status: ACTIVE | Noted: 2023-06-30

## 2024-02-29 NOTE — ASSESSMENT
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  Discussed with patient and  recommendation would be for a total colectomy with ileorectal anastomosis.  This operation would reduce the chance of developing colon cancer in her colon and also give her the best option in terms of bowel function control.  Risk and benefits of the surgery have been discussed which include bleeding, infection, sepsis, multiorgan failure, inadvertent injury including hollow viscus, solid organ, ureter neurovascular and neurological structures, DVT PE, heart attack, stroke, hernias, recurrence, leakage of anastomosis require reoperation possible stoma and death. Presurgical testing with CBC BMP PT PTT chest x-ray EKG hemoglobin A1c mechanical bowel preparation prophylactic antibiotics DVT prophylaxis

## 2024-02-29 NOTE — HISTORY OF PRESENT ILLNESS
[FreeTextEntry1] : 53-year-old female with MYH- variant of familial polyposis syndrome. she has been found to have a significant amount of polyps throughout her colon currently her gastroenterologist Dr. Rod is very concerned given the extensive amount of polyps

## 2024-02-29 NOTE — PHYSICAL EXAM
[Abdomen Masses] : No abdominal masses [Abdomen Tenderness] : ~T No ~M abdominal tenderness [JVD] : no jugular venous distention  [Normal Breath Sounds] : Normal breath sounds [Normal Heart Sounds] : normal heart sounds [No Rash or Lesion] : No rash or lesion [Normal Rate and Rhythm] : normal rate and rhythm [Alert] : alert [Oriented to Person] : oriented to person [Oriented to Time] : oriented to time [Oriented to Place] : oriented to place [Anxious] : anxious [de-identified] : Looks well in no distress, of stated age. [de-identified] :  scar [de-identified] : Moves all 4 extremities appropriately with 5 over 5 strength [de-identified] : Pupils equal reactive to light normocephalic atraumatic.

## 2024-02-29 NOTE — DATA REVIEWED
[FreeTextEntry1] : Colonoscopies demonstrate multiple polyps throughout the colon mostly rectal sparing

## 2024-03-01 ENCOUNTER — TRANSCRIPTION ENCOUNTER (OUTPATIENT)
Age: 54
End: 2024-03-01

## 2024-03-04 ENCOUNTER — TRANSCRIPTION ENCOUNTER (OUTPATIENT)
Age: 54
End: 2024-03-04

## 2024-03-05 ENCOUNTER — APPOINTMENT (OUTPATIENT)
Dept: COLORECTAL SURGERY | Facility: CLINIC | Age: 54
End: 2024-03-05
Payer: COMMERCIAL

## 2024-03-05 ENCOUNTER — TRANSCRIPTION ENCOUNTER (OUTPATIENT)
Age: 54
End: 2024-03-05

## 2024-03-05 VITALS — BODY MASS INDEX: 19.63 KG/M2 | HEIGHT: 60 IN | WEIGHT: 100 LBS

## 2024-03-05 DIAGNOSIS — D12.6 BENIGN NEOPLASM OF COLON, UNSPECIFIED: ICD-10-CM

## 2024-03-05 DIAGNOSIS — D13.91 FAMILIAL ADENOMATOUS POLYPOSIS: ICD-10-CM

## 2024-03-05 PROCEDURE — 99443: CPT

## 2024-03-06 ENCOUNTER — TRANSCRIPTION ENCOUNTER (OUTPATIENT)
Age: 54
End: 2024-03-06

## 2024-03-08 ENCOUNTER — TRANSCRIPTION ENCOUNTER (OUTPATIENT)
Age: 54
End: 2024-03-08

## 2024-03-11 ENCOUNTER — OUTPATIENT (OUTPATIENT)
Dept: OUTPATIENT SERVICES | Facility: HOSPITAL | Age: 54
LOS: 1 days | Discharge: HOME | End: 2024-03-11

## 2024-03-11 DIAGNOSIS — Z31.5 ENCOUNTER FOR PROCREATIVE GENETIC COUNSELING: ICD-10-CM

## 2024-03-12 ENCOUNTER — APPOINTMENT (OUTPATIENT)
Dept: HEMATOLOGY ONCOLOGY | Facility: CLINIC | Age: 54
End: 2024-03-12
Payer: COMMERCIAL

## 2024-03-12 PROCEDURE — 99205 OFFICE O/P NEW HI 60 MIN: CPT | Mod: 95

## 2024-03-19 ENCOUNTER — TRANSCRIPTION ENCOUNTER (OUTPATIENT)
Age: 54
End: 2024-03-19

## 2024-03-21 ENCOUNTER — NON-APPOINTMENT (OUTPATIENT)
Age: 54
End: 2024-03-21

## 2024-03-28 NOTE — ASSESSMENT
[FreeTextEntry1] : The visit was provided via telehealth using real-time 2-way audio visual technology. The patient, Beatriz Araujo, was located at home, [address] [place] [address], at the time of the visit. The Genetic Counselor, Hannah Greer, was located remotely in [place], [place]. The physician, Dr. Prabhjot Dee, was located in [place], [place]. The patient and both providers participated in the telehealth encounter. Her spouse also participated. Consent for telehealth services was given on 3/12/2024 by the patient, Beatriz Araujo.    REASON FOR CONSULT  Beatriz Araujo is a 53-year-old female who was seen 3/12/2024 for a discussion regarding her genetic testing results related to hereditary cancer predisposition. She was accompanied by her spouse.  RELEVANT MEDICAL HISTORY  Ms. Araujo is a healthy individual who has never had cancer. She has a family history of cancer, see below.   Ms. 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 This test was ordered by Dr. Chase Larose and reported on 2023.  OTHER MEDICAL AND SURGICAL HISTORY:  Medical: high cholesterol (using red yeast rice), anxiety (taking Lexapro) Surgical:  x2, Tonsillectomy   PAST OB/GYN HISTORY:  Obstetrical History:   Age at Menarche: 12  Menopausal with LMP at age 50 Age at First Live Birth: 31  Oral Contraceptive Use: Yes, approx. 5 years total  Hormone Replacement Therapy: Estradiol Cream 3x/week starting 2023   CANCER SCREENING HISTORY:    Breast:  -	Most recent mammo: 10/20/2023; Results: BR2 Benign Findings; Frequency: yearly -	Most recent sono: 10/20/2023; Results: BR1 Negative; Frequency: yearly -	Breast MRI: None -	Breast Biopsies: None GYN: -	Most recent GYN annual exam: ; Frequency: yearly -	Most recent pap smear: ; Results: NILM; Frequency: yearly -	Most recent transvaginal ultrasound: 2020; Results: TV scan, anteverted uterus with heterogenous myometrium, endo thickness 4mm with scant cavity fluid seen, normal appearing RT ovary, clear LT ovary cyst 7mm, no free fluid seen. Colon: -	2023 Colonoscopy: Many pedunculated polyps (5mm to 3cm) throughout the colon. Polypectomy performed. Many sessile polyps (5mm to 7mm) in whole colon. Polypectomy performed. Path: 7 Tubular adenomas, 5 Tubulovillous adenomas -	2023 Colonoscopy: 8 sessile polyps (5mm to 1cm) in hepatic flexure, distal transverse colon, distal descending colon, mid-transverse colon, mid-descending colon and proximal descending colon. Polypectomy performed. Three sessile 6mm polyps in proximal ascending colon and mid-descending colon. Polypectomy performed. Path: at least 7 adenomatous polyps, 1 villoglandular adenoma -	2024 Colonoscopy: Three sessile polyps (5mm to 8mm). Polypectomy performed. Ten add'l sessile polyps (1 cm to 1.5cm) scattered throughout colon. Polypectomy performed; Path: at least 7 adenomatous polyps with 1 villoglandular adenoma -	Frequency: 6 months -	Total Polyps: at least 36 per reports Skin:   -	Most recent skin exam: 1 year ago; Results: reportedly benign lesion removed  Other: -	Endoscopy: None -	Thyroid: Reportedly imaging yearly due to nodules   SOCIAL HISTORY:  -	 -	Tobacco-product use: Never smoker -	Environmental exposure: Secondhand smoke exposure as father was a smoker    FAMILY HISTORY:  Maternal and paternal ancestry was reported as American (Non-Ashkenazi). A detailed family history of cancer was ascertained, see below and scanned pedigree in chart.   To Ms. Araujo's knowledge, no one in the family has had germline testing for cancer susceptibility.      RESULTS INTERPRETATION AND ASSESSMENT:  We reviewed with Ms. Araujo that she tested positive for a homozygous (two copies) pathogenic mutation in the MUTYH gene. This is consistent with the autosomal-recessive cancer predisposition syndrome called MUTYH-associated Polyposis syndrome, or MAP. This syndrome is characterized by adult-onset multiple colorectal adenomas and increased risk for colorectal cancer.   We discussed that individuals with MAP have an increased risk for colon polyps as well as an increased risk to develop colon cancer, though the exact magnitude of this risk is unclear in the setting of regular colonoscopy. There is also an increased risk for duodenal cancer, as well as duodenal polyposis and gastric fundic gland polyps. Studies have suggested that there is also an increased risk for other cancers, although there is limited evidence at this time.  While no cause of the patient's family history of cancer was identified, this result, while reassuring, does entirely not rule out a hereditary cancer risk in the patient. It is possible, although unlikely, the patient has a mutation in one of the genes tested that is not detectable by this analysis, or has a mutation in a different gene, either known or unknown. It is also possible there is a hereditary cancer predisposition in the family, but the patient did not inherit it.    IMPLICATIONS FOR THE PATIENT:  Given Ms. Araujo's personal and current reported family history of cancer, and her homozygous MUTYH positive genetic test results consistent with MUTYH-associated Polyposis syndrome, the following screening guidelines and risk-reducing recommendations were discussed:    COLON:  -If a small polyp burden is found (e.g. < 20 adenomas; <1cm; none with advanced histology), repeat colonoscopy is recommended every 1-2 years, or sooner based on shared decision-making with gastroenterology team. Per 2024 colonoscopy report, Ms. Araujo is to have repeat colonoscopy in 6 months, and she is currently scheduled for follow-up colonoscopy on 2024. -If polyp burden cannot be handled endoscopically, surgical evaluation is recommended. Of note, Ms. Araujo has had discussion regarding total colectomy with ileorectal anastomosis with colorectal surgeon Dr. Chase Larose. She currently planning to proceed with colectomy this summer although she states she is reluctant. This surgery has not been scheduled yet.  - Given Ms. Araujo is reluctant to pursue colectomy and she is planning to wait until Summer 2024 to proceed with surgery due to her daughter's graduation in 2024, we discussed it may be reasonable to consider having one additional colonoscopy prior to colectomy to allow the natural history of her polyposis to declare itself. We discussed that she and her care team may re-assess whether she would be managed with continued colonoscopy screening vs colectomy based on the results of one additional colonoscopy and severity of polyp burden at that time. We discussed that proceeding directly with colectomy at this time is also reasonable given the prior colonoscopy and pathology findings.  - She was encouraged to discuss these options in greater detail with her colorectal team, Dr. Larose and Dr. Rod. Ms. Araujo was informed that if she chooses to pursue one additional colonoscopy, then we remain available to see her for follow-up risk assessment afterward to reassess her management options. - Additionally, if patient has colectomy with GABINO, then endoscopic evaluation of rectum every 6-12 months depending on polyp burden. If rectal polyposis is not manageable with polypectomy, surgical evaluation is recommended.  EXTRACOLONIC: - Baseline upper endoscopy with complete visualization of the ampulla of Vater with timing of repeat endoscopy based on findings as recommended per her gastroenterology team.  OTHER:   - Annual physical examination - In the absence of other indications, Ms. Araujo should practice age-appropriate cancer screening of other organ systems as recommended for the general population.    REPRODUCTIVE AND FAMILY MEMBER IMPLICATIONS:  MAP is inherited in an autosomal recessive pattern. We recommend the patient's first-degree relatives, specifically her mother, her siblings, and her daughters, pursue genetic counseling and genetic testing to assess their MUTYH status. We discussed that Ms. Araujo's parents are likely obligate carriers, therefore, her full siblings have up to a 25% (1 in 4) risk to inherit MAP syndrome, a 50% (1 in 2) risk to have one MUTYH mutation, and a 25% (1 in 4) chance that they will not have a MUTYH mutation.   Maternal relatives may also consider pursuing testing related to the maternal family history of breast and ovarian cancer.   Ms. Araujo was made aware that if any at-risk relatives wanted to pursue genetic testing any time in the future, we would be happy to see them and coordinate testing. If any relatives would like to pursue genetic counseling with the option of genetic testing and are not local, then they can locate a genetic counselor using the National Society of Genetic Counselors, Find a Genetic Counselor Tool (www.nsgc.org/findageneticcounselor).    RESOURCES & SUPPORT GROUPS   Facing Our Risk of cancer Empowered (FORCE): www.FacingOurRisk.org      Ms. Araujo may also contact an organization devoted to hereditary colorectal cancer called Hereditary Colon Cancer Takes Guts. This organization provides support, education, advocacy, awareness specific to hereditary colon cancer. She may also contact the Colon Cancer [place] (https://www.ccalliance.org/).  In addition, the oncology social workers at [facility] are available to assist with more referrals, if necessary.    PLAN:  1. See above note for recommended management.  2. We encouraged sharing these results with family members. They have a risk to have inherited the same mutation. Other family may benefit from genetic testing and should contact a certified genetic counselor specializing in cancer. Due to HIPAA and [place] laws, Genetics is unable to directly contact other family at risk, but we are available should family members wish to reach out to us  3. Family support resources and referrals were provided.   4. Patient informed consult note(s) will be available through their [facility] patient portal.   5. Ms. Araujo was informed that we remain available to see her for follow-up risk assessment should she choose to pursue one additional colonoscopy and wish to reassess the continued appropriateness of the cancer screening and risk-reduction strategy outlined today.  6. Genetic knowledge changes rapidly. We encouraged re-contacting Cancer Genetics every 2-3 years for any changes in screening recommendations or sooner if there are significant changes in personal or family history    For any additional questions please call Cancer Genetics at [phone].       Hannah Greer MS, [facility] [place]  Genetic Counselor, Cancer Genetics     Attending Attestation:  I have reviewed and edited the genetic counselor's note and I agree with the assessment and plan as documented. I spent approximately 60 minutes in total time of which approximately 35-40 minutes was face-to-face (via 2-way audiovisual telemedicine connection) with Ms. Araujo reviewing her relevant personal and family history, the genetic testing results, our risk-assessment, and options for future cancer risk-reduction for the patient and her relevant family members. Over half this time was spent in counseling and coordination of care.  Prabhjot Dee MD Chief, Cancer Genetics [facility]    CC:   Patient  Dr. Chase Rod

## 2024-03-29 ENCOUNTER — NON-APPOINTMENT (OUTPATIENT)
Age: 54
End: 2024-03-29

## 2024-05-02 ENCOUNTER — NON-APPOINTMENT (OUTPATIENT)
Age: 54
End: 2024-05-02

## 2024-05-10 ENCOUNTER — NON-APPOINTMENT (OUTPATIENT)
Age: 54
End: 2024-05-10

## 2024-05-23 ENCOUNTER — APPOINTMENT (OUTPATIENT)
Dept: GASTROENTEROLOGY | Facility: AMBULATORY MEDICAL SERVICES | Age: 54
End: 2024-05-23
Payer: COMMERCIAL

## 2024-05-23 ENCOUNTER — RESULT REVIEW (OUTPATIENT)
Age: 54
End: 2024-05-23

## 2024-05-23 PROCEDURE — 45385 COLONOSCOPY W/LESION REMOVAL: CPT | Mod: 33

## 2024-05-23 PROCEDURE — 45380 COLONOSCOPY AND BIOPSY: CPT | Mod: 33,59

## 2024-05-28 ENCOUNTER — TRANSCRIPTION ENCOUNTER (OUTPATIENT)
Age: 54
End: 2024-05-28

## 2024-05-28 RX ORDER — ESTRADIOL 0.1 MG/G
0.1 CREAM VAGINAL
Qty: 1 | Refills: 2 | Status: ACTIVE | COMMUNITY
Start: 2023-07-18 | End: 1900-01-01

## 2024-05-29 ENCOUNTER — OUTPATIENT (OUTPATIENT)
Dept: OUTPATIENT SERVICES | Facility: HOSPITAL | Age: 54
LOS: 1 days | Discharge: HOME | End: 2024-05-29

## 2024-05-29 DIAGNOSIS — Z31.5 ENCOUNTER FOR PROCREATIVE GENETIC COUNSELING: ICD-10-CM

## 2024-05-30 ENCOUNTER — APPOINTMENT (OUTPATIENT)
Dept: HEMATOLOGY ONCOLOGY | Facility: CLINIC | Age: 54
End: 2024-05-30
Payer: COMMERCIAL

## 2024-05-30 PROCEDURE — 99215 OFFICE O/P EST HI 40 MIN: CPT | Mod: 95

## 2024-06-03 ENCOUNTER — NON-APPOINTMENT (OUTPATIENT)
Age: 54
End: 2024-06-03

## 2024-06-04 ENCOUNTER — TRANSCRIPTION ENCOUNTER (OUTPATIENT)
Age: 54
End: 2024-06-04

## 2024-06-17 NOTE — ASSESSMENT
[FreeTextEntry1] : The visit was provided via telehealth using real-time 2-way audio visual technology. The patient, Beatriz Araujo, was located at home, [address] [place] [address], at the time of the visit. The Genetic Counselor, Hannah Greer, was located at the medical office in [place], [place]. The physician, Dr. Prabhjot Dee, was located in [place], [place]. The patient and both providers participated in the telehealth encounter. Her spouse also participated. Consent for telehealth services was given on 2024 by the patient, Beatriz Araujo.    REASON FOR CONSULT  Beatriz Araujo is a 54-year-old female who was seen 2024 for a follow-up discussion regarding her genetic testing results related to hereditary cancer predisposition. She was accompanied by her spouse.  RELEVANT MEDICAL HISTORY  Ms. Araujo is a healthy individual who has never had cancer. She has a family history of cancer, see below.   Ms. 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 This test was ordered by Dr. Chase Larose and reported on 2023.  INTERVAL HISTORY: - Ms. Araujo reportedly had a thyroid biopsy performed in 2024 which resulted as benign.  - She also had a repeat colonoscopy on 2024. Twelve polyps were removed, measuring from 5-10mm in size. All of the polyps were reported as adenomatous on pathology. Of note, none of the polyps were noted to have significant villous features or high-grade dysplasia.   With regards to her relatives, she reports her sister pursued genetic testing and was found to have a single MUTYH gene mutation, report currently unavailable for review.  OTHER MEDICAL AND SURGICAL HISTORY:  Medical: high cholesterol (using red yeast rice), anxiety (taking Lexapro) Surgical:  x2, Tonsillectomy   PAST OB/GYN HISTORY:  Obstetrical History:   Age at Menarche: 12  Menopausal with LMP at age 50 Age at First Live Birth: 31  Oral Contraceptive Use: Yes, approx. 5 years total  Hormone Replacement Therapy: No  Cream  3x/week: Estradiol 2023   CANCER SCREENING HISTORY:    Breast:  -	Most recent mammo: 10/20/2023; Results: BR2 Benign Findings; Frequency: yearly -	Most recent sono: 10/20/2023; Results: BR1 Negative; Frequency: yearly -	Breast MRI: None -	Breast Biopsies: None GYN: -	Most recent GYN annual exam: ; Frequency: yearly -	Most recent pap smear:  -	Most recent transvaginal ultrasound: 2020; Results: TV scan, anteverted uterus with heterogenous myometrium, endo thickness 4mm with scant cavity fluid seen, normal appearing RT ovary, clear LT ovary cyst 7mm, no free fluid seen. Colon: -	2023 Colonoscopy: Many pedunculated polyps (5mm to 3cm) throughout the colon. Polypectomy performed. Many sessile polyps (5mm to 7mm) in whole colon. Polypectomy performed. Path: 7 Tubular adenomas, 5 Tubulovillous adenomas -	2023 Colonoscopy: 8 sessile polyps (5mm to 1cm) in hepatic flexure, distal transverse colon, distal descending colon, mid-transverse colon, mid-descending colon and proximal descending colon. Polypectomy performed. Three sessile 6mm polyps in proximal ascending colon and mid-descending colon. Polypectomy performed. Path: at least 7 adenomatous polyps, 1 villoglandular adenoma -	2024 Colonoscopy: Three sessile polyps (5mm to 8mm). Polypectomy performed. Ten add'l sessile polyps (1 cm to 1.5cm) scattered throughout colon. Polypectomy performed; Path: at least 7 adenomatous polyps with 1 villoglandular adenoma -	2024 Colonoscopy: as above -	Frequency: 6 months -	Total Polyps: at least 48 per reports Skin:   -	Most recent skin exam: 1 year ago; Results: reportedly benign lesion removed  Other: -	Endoscopy: None -	Thyroid: Reportedly imaging yearly due to nodules   SOCIAL HISTORY:  -	 -	Tobacco-product use: Never smoker -	Environmental exposure: Secondhand smoke exposure as father was a smoker    FAMILY HISTORY:  Maternal and paternal ancestry was reported as American (Non-Ashkenazi). A detailed family history of cancer was ascertained, see below and scanned pedigree in chart.   To Ms. Araujo's knowledge, no one in the family has had germline testing for cancer susceptibility.      RESULTS INTERPRETATION AND ASSESSMENT:  We reviewed with Ms. Araujo that she tested positive for a homozygous (two copies) pathogenic mutation in the MUTYH gene. This is consistent with the autosomal-recessive cancer predisposition syndrome called MUTYH-associated Polyposis syndrome, or MAP. This syndrome is characterized by adult-onset multiple colorectal adenomas and increased risk for colorectal cancer.   We discussed that individuals with MAP have an increased risk for colon polyps as well as an increased risk to develop colon cancer, though the exact magnitude of this risk is unclear in the setting of regular colonoscopy. There is also an increased risk for duodenal cancer, as well as duodenal polyposis and gastric fundic gland polyps. Studies have suggested that there is also an increased risk for other cancers, although there is limited evidence at this time.   IMPLICATIONS FOR THE PATIENT:  Given Ms. Araujo's personal and current reported family history of cancer, and her homozygous MUTYH positive genetic test results consistent with MUTYH-associated Polyposis syndrome, the following screening guidelines and risk-reducing recommendations were discussed:    COLON:  -Per National Comprehensive Cancer Network Genetic/Familial High-Risk Assessment: Colorectal Guidelines Version 2., if a small polyp burden is found (e.g. < 20 adenomas; <1cm; none with advanced histology), repeat colonoscopy is recommended every 1-2 years, or sooner based on shared decision-making with gastroenterology team. If polyp burden cannot be managed endoscopically, surgical evaluation is recommended. Additionally, if patient has colectomy with GABINO, then endoscopic evaluation of rectum every 6-12 months depending on polyp burden.  -Of note, Ms. Araujo has had discussion regarding total colectomy with ileorectal anastomosis with colorectal surgeon Dr. Chase Larose. She tentatively scheduled this for 24 pending discussion of her most recent colonoscopy findings with Cancer Genetics and her GI, Dr. Chase Rod.  - We extensively reviewed and discussed the colonoscopy findings and pathology from Ms. Araujo's 2024 colonoscopy and prior colonoscopies. Given Ms. Araujo is reluctant to pursue colectomy, and taking together her previous colonoscopy findings, including the absence of advanced adenomas on her most recent colonoscopy, we discussed it may be reasonable for her to continue to undergo colonoscopy screening every 6 months. at least in the short term.  -We informed Ms. Araujo that should she be identified to have advanced adenomas on future colonoscopy screening, then we would reassess the continued appropriateness of this screening regimen.  - Ms. Araujo was encouraged to have a follow-up discussion with her GI, Dr. Chase Rod, regarding her 2024 colonoscopy results. She states they had a brief discussion following her colonoscopy procedure during which she was informed of the total number of polyps and follow-up colonoscopy was reportedly advised to occur in 6 months, however, pathology was not available at that time. Therefore, we encouraged Ms. Araujo to follow-up with Dr. Rod to discuss the option of continued screening vs colectomy.   EXTRACOLONIC: - We recommended, baseline upper endoscopy with complete visualization of the ampulla of Vater with timing of repeat endoscopy based on findings as recommended per her gastroenterology team.  OTHER:   - In the absence of other indications, Ms. Araujo should practice age-appropriate cancer screening of other organ systems as recommended for the general population.    REPRODUCTIVE AND FAMILY MEMBER IMPLICATIONS:  MAP is inherited in an autosomal recessive pattern. We recommend the patient's first-degree relatives, specifically her mother, her siblings, and her daughters, pursue genetic counseling and genetic testing to assess their MUTYH status. We discussed that Ms. Araujo's parents are likely obligate carriers, therefore, her full siblings have up to a 25% (1 in 4) risk to inherit MAP syndrome, a 50% (1 in 2) risk to have one MUTYH mutation, and a 25% (1 in 4) chance that they will not have a MUTYH mutation.   Maternal relatives may also consider pursuing testing related to the maternal family history of breast and ovarian cancer.   Ms. Araujo was made aware that if any at-risk relatives wanted to pursue genetic testing any time in the future, we would be happy to see them and coordinate testing. If any relatives would like to pursue genetic counseling with the option of genetic testing and are not local, then they can locate a genetic counselor using the National Society of Genetic Counselors, Find a Genetic Counselor Tool (www.nsgc.org/findageneticcounselor).    RESOURCES & SUPPORT GROUPS  - Facing Our Risk of cancer Empowered (FORCE): www.FacingOurRisk.org      Ms. Araujo may also contact an organization devoted to hereditary colorectal cancer called Hereditary Colon Cancer Takes Guts. This organization provides support, education, advocacy, awareness specific to hereditary colon cancer. She may also contact the Colon Cancer [place] (https://www.ccalliance.org/).  In addition, the oncology social workers at [facility] are available to assist with more referrals, if necessary.  PLAN:  1. See above note for recommended management.  2. We encouraged sharing these results with family members. They have a risk to have inherited the same mutation. Other family may benefit from genetic testing and should contact a certified genetic counselor specializing in cancer. Due to HIPAA and [place] laws, Genetics is unable to directly contact other family at risk, but we are available should family members wish to reach out to us  3. Family support resources and referrals were provided.   4. Patient informed consult note(s) will be available through their [facility] patient portal.   5. Ms. Araujo was informed that we remain available to see her for follow-up risk assessment should any advanced adenomas be identified on future colonoscopy screening and she wish to reassess the continued appropriateness of the cancer screening and risk-reduction strategy outlined today.  6. Genetic knowledge changes rapidly. Given this, we also encouraged re-contacting Cancer Genetics every 2-3 years for any changes in screening recommendations or sooner if there are significant changes in personal or family history    For any additional questions please call Cancer Genetics at [phone].       Hannah Greer MS, [facility] [place]  Genetic Counselor, Cancer Genetics     Attending Attestation:  I have reviewed and edited the genetic counselor's note and I agree with the assessment and plan as documented. I spent approximately 75 minutes in total time of which approximately 55 minutes was face-to-face (via 2-way audiovisual telemedicine connection) with Ms. Araujo reviewing her relevant personal and family history, the genetic testing results, our risk-assessment, and options for future cancer risk-reduction for the patient and her relevant family members. Over half this time was spent in counseling and coordination of care.  Prabhjot Dee MD Chief, Cancer Genetics [facility]    CC:   Patient  Dr. Chase Rod

## 2024-07-22 ENCOUNTER — APPOINTMENT (OUTPATIENT)
Dept: OBGYN | Facility: CLINIC | Age: 54
End: 2024-07-22
Payer: COMMERCIAL

## 2024-07-22 ENCOUNTER — NON-APPOINTMENT (OUTPATIENT)
Age: 54
End: 2024-07-22

## 2024-07-22 VITALS
HEIGHT: 60 IN | BODY MASS INDEX: 19.73 KG/M2 | WEIGHT: 100.5 LBS | DIASTOLIC BLOOD PRESSURE: 60 MMHG | SYSTOLIC BLOOD PRESSURE: 102 MMHG

## 2024-07-22 DIAGNOSIS — Z12.4 ENCOUNTER FOR SCREENING FOR MALIGNANT NEOPLASM OF CERVIX: ICD-10-CM

## 2024-07-22 DIAGNOSIS — Z01.419 ENCOUNTER FOR GYNECOLOGICAL EXAMINATION (GENERAL) (ROUTINE) W/OUT ABNORMAL FINDINGS: ICD-10-CM

## 2024-07-22 DIAGNOSIS — Z12.31 ENCOUNTER FOR SCREENING MAMMOGRAM FOR MALIGNANT NEOPLASM OF BREAST: ICD-10-CM

## 2024-07-22 DIAGNOSIS — R92.30 DENSE BREASTS, UNSPECIFIED: ICD-10-CM

## 2024-07-22 PROCEDURE — 99396 PREV VISIT EST AGE 40-64: CPT

## 2024-07-22 PROCEDURE — 99459 PELVIC EXAMINATION: CPT

## 2024-07-23 RX ORDER — ESCITALOPRAM OXALATE 5 MG/1
5 TABLET, FILM COATED ORAL
Refills: 0 | Status: ACTIVE | COMMUNITY

## 2024-07-23 RX ORDER — ESCITALOPRAM OXALATE 10 MG/1
10 TABLET, FILM COATED ORAL
Refills: 0 | Status: ACTIVE | COMMUNITY

## 2024-07-23 RX ORDER — BERBERINE CHLOR/SEAWEED/CHROM 500-250 MG
CAPSULE ORAL
Refills: 0 | Status: ACTIVE | COMMUNITY

## 2024-07-23 RX ORDER — ALPRAZOLAM 0.25 MG/1
0.25 TABLET ORAL
Qty: 15 | Refills: 0 | Status: ACTIVE | COMMUNITY
Start: 2024-02-15

## 2024-07-23 RX ORDER — MULTIVIT-MIN/IRON/FOLIC ACID/K 18-600-40
CAPSULE ORAL
Refills: 0 | Status: ACTIVE | COMMUNITY

## 2024-07-23 RX ORDER — UBIDECARENONE/VIT E ACET 100MG-5
CAPSULE ORAL
Refills: 0 | Status: ACTIVE | COMMUNITY

## 2024-07-23 NOTE — PHYSICAL EXAM
[Chaperone Present] : A chaperone was present in the examining room during all aspects of the physical examination [77792] : A chaperone was present during the pelvic exam. [Appropriately responsive] : appropriately responsive [Alert] : alert [No Acute Distress] : no acute distress [No Lymphadenopathy] : no lymphadenopathy [Soft] : soft [Non-tender] : non-tender [Non-distended] : non-distended [Oriented x3] : oriented x3 [Examination Of The Breasts] : a normal appearance [No Discharge] : no discharge [No Masses] : no breast masses were palpable [Labia Majora] : normal [Labia Minora] : normal [No Bleeding] : There was no active vaginal bleeding [Normal] : normal [Uterine Adnexae] : non-palpable [FreeTextEntry2] : gagan [Atrophy] : atrophy

## 2024-07-23 NOTE — HISTORY OF PRESENT ILLNESS
[No] : Patient does not have concerns regarding sex [Currently Active] : currently active [Patient refuses STI testing] : Patient refuses STI testing [FreeTextEntry1] : Beatriz is a  postmenopausal  who presents for annual exam. She is without complaints. Denies pelvic pain, abnormal bleeding, or vaginal discharge. Denies issues with bowel or bladder function.  She is sexually active with male partner (s). She has been using estradiol cream.  Lives with family. Works as [employer]. Feels safe at home. Denies depression/abuse.  Denies TAD.  Seeing cancer genetics and colorectal for hereditary risk for colon cancer MUTYH.

## 2024-07-23 NOTE — HISTORY OF PRESENT ILLNESS
[No] : Patient does not have concerns regarding sex [Currently Active] : currently active [Patient refuses STI testing] : Patient refuses STI testing [FreeTextEntry1] : Beatriz is a  postmenopausal  who presents for annual exam. She is without complaints. Denies pelvic pain, abnormal bleeding, or vaginal discharge. Denies issues with bowel or bladder function.  She is sexually active with male partner (s). She has been using estradiol cream.  Lives with family. Works as [employer]. Feels safe at home. Denies depression/abuse.  Denies TAD.  Seeing cancer genetics and colorectal for hereditary risk for colon cancer MUTYH.

## 2024-07-23 NOTE — PHYSICAL EXAM
[Chaperone Present] : A chaperone was present in the examining room during all aspects of the physical examination [54814] : A chaperone was present during the pelvic exam. [Appropriately responsive] : appropriately responsive [Alert] : alert [No Acute Distress] : no acute distress [No Lymphadenopathy] : no lymphadenopathy [Soft] : soft [Non-tender] : non-tender [Non-distended] : non-distended [Oriented x3] : oriented x3 [Examination Of The Breasts] : a normal appearance [No Discharge] : no discharge [No Masses] : no breast masses were palpable [Labia Majora] : normal [Labia Minora] : normal [No Bleeding] : There was no active vaginal bleeding [Normal] : normal [Uterine Adnexae] : non-palpable [FreeTextEntry2] : gagan [Atrophy] : atrophy

## 2024-07-23 NOTE — DISCUSSION/SUMMARY
[FreeTextEntry1] :   The benefits of adequate calcium intake and a daily multivitamin along with routine daily cardiovascular exercise were reviewed with the patient.   The patient will continue to follow with GI and CRS.    The importance of safer-sex was discussed with the patient. We reviewed ASCCP/ACOG guidelines for pap smears.

## 2024-07-24 LAB — HPV HIGH+LOW RISK DNA PNL CVX: NOT DETECTED

## 2024-07-26 LAB — CYTOLOGY CVX/VAG DOC THIN PREP: ABNORMAL

## 2024-09-26 ENCOUNTER — NON-APPOINTMENT (OUTPATIENT)
Age: 54
End: 2024-09-26

## 2024-10-25 ENCOUNTER — TRANSCRIPTION ENCOUNTER (OUTPATIENT)
Age: 54
End: 2024-10-25

## 2024-11-06 ENCOUNTER — TRANSCRIPTION ENCOUNTER (OUTPATIENT)
Age: 54
End: 2024-11-06

## 2024-11-07 ENCOUNTER — TRANSCRIPTION ENCOUNTER (OUTPATIENT)
Age: 54
End: 2024-11-07

## 2024-11-08 ENCOUNTER — TRANSCRIPTION ENCOUNTER (OUTPATIENT)
Age: 54
End: 2024-11-08

## 2024-11-09 ENCOUNTER — TRANSCRIPTION ENCOUNTER (OUTPATIENT)
Age: 54
End: 2024-11-09

## 2024-11-11 ENCOUNTER — TRANSCRIPTION ENCOUNTER (OUTPATIENT)
Age: 54
End: 2024-11-11

## 2024-11-19 ENCOUNTER — APPOINTMENT (OUTPATIENT)
Dept: GASTROENTEROLOGY | Facility: AMBULATORY MEDICAL SERVICES | Age: 54
End: 2024-11-19

## 2024-11-19 ENCOUNTER — RESULT REVIEW (OUTPATIENT)
Age: 54
End: 2024-11-19

## 2024-11-19 PROCEDURE — 45385 COLONOSCOPY W/LESION REMOVAL: CPT | Mod: 33

## 2024-11-19 PROCEDURE — 45380 COLONOSCOPY AND BIOPSY: CPT | Mod: 33,59

## 2025-07-16 ENCOUNTER — TRANSCRIPTION ENCOUNTER (OUTPATIENT)
Age: 55
End: 2025-07-16

## 2025-07-21 ENCOUNTER — APPOINTMENT (OUTPATIENT)
Dept: OBGYN | Facility: CLINIC | Age: 55
End: 2025-07-21
Payer: MEDICAID

## 2025-07-21 VITALS
SYSTOLIC BLOOD PRESSURE: 122 MMHG | DIASTOLIC BLOOD PRESSURE: 74 MMHG | BODY MASS INDEX: 19.63 KG/M2 | WEIGHT: 100 LBS | HEIGHT: 60 IN

## 2025-07-21 DIAGNOSIS — Z11.51 ENCOUNTER FOR SCREENING FOR HUMAN PAPILLOMAVIRUS (HPV): ICD-10-CM

## 2025-07-21 DIAGNOSIS — Z12.4 ENCOUNTER FOR SCREENING FOR MALIGNANT NEOPLASM OF CERVIX: ICD-10-CM

## 2025-07-21 DIAGNOSIS — Z12.39 ENCOUNTER FOR OTHER SCREENING FOR MALIGNANT NEOPLASM OF BREAST: ICD-10-CM

## 2025-07-21 DIAGNOSIS — Z01.419 ENCOUNTER FOR GYNECOLOGICAL EXAMINATION (GENERAL) (ROUTINE) W/OUT ABNORMAL FINDINGS: ICD-10-CM

## 2025-07-21 PROCEDURE — 99386 PREV VISIT NEW AGE 40-64: CPT

## 2025-07-21 PROCEDURE — 99459 PELVIC EXAMINATION: CPT | Mod: NC

## 2025-07-23 ENCOUNTER — TRANSCRIPTION ENCOUNTER (OUTPATIENT)
Age: 55
End: 2025-07-23

## 2025-07-24 ENCOUNTER — TRANSCRIPTION ENCOUNTER (OUTPATIENT)
Age: 55
End: 2025-07-24

## 2025-07-24 LAB
CYTOLOGY CVX/VAG DOC THIN PREP: ABNORMAL
HPV HIGH+LOW RISK DNA PNL CVX: NOT DETECTED

## 2025-07-31 ENCOUNTER — TRANSCRIPTION ENCOUNTER (OUTPATIENT)
Age: 55
End: 2025-07-31